# Patient Record
Sex: FEMALE | Race: WHITE | NOT HISPANIC OR LATINO | Employment: OTHER | URBAN - METROPOLITAN AREA
[De-identification: names, ages, dates, MRNs, and addresses within clinical notes are randomized per-mention and may not be internally consistent; named-entity substitution may affect disease eponyms.]

---

## 2020-08-18 ENCOUNTER — CONSULT (OUTPATIENT)
Dept: CARDIOLOGY | Facility: CLINIC | Age: 70
End: 2020-08-18

## 2020-08-18 VITALS
HEART RATE: 77 BPM | BODY MASS INDEX: 29.35 KG/M2 | SYSTOLIC BLOOD PRESSURE: 142 MMHG | DIASTOLIC BLOOD PRESSURE: 78 MMHG | WEIGHT: 187 LBS | HEIGHT: 67 IN | TEMPERATURE: 96.9 F | OXYGEN SATURATION: 98 %

## 2020-08-18 DIAGNOSIS — E78.5 HYPERLIPIDEMIA, UNSPECIFIED HYPERLIPIDEMIA TYPE: Primary | ICD-10-CM

## 2020-08-18 PROCEDURE — 99244 OFF/OP CNSLTJ NEW/EST MOD 40: CPT | Performed by: INTERNAL MEDICINE

## 2020-08-18 PROCEDURE — 93000 ELECTROCARDIOGRAM COMPLETE: CPT | Performed by: INTERNAL MEDICINE

## 2020-08-18 RX ORDER — BUPROPION HYDROCHLORIDE 300 MG/1
300 TABLET ORAL DAILY
COMMUNITY

## 2020-08-18 RX ORDER — FLECAINIDE ACETATE 100 MG/1
100 TABLET ORAL 2 TIMES DAILY
Qty: 180 TABLET | Refills: 6 | Status: SHIPPED | OUTPATIENT
Start: 2020-08-18 | End: 2022-03-07

## 2020-08-18 RX ORDER — METOPROLOL SUCCINATE 25 MG/1
25 TABLET, EXTENDED RELEASE ORAL DAILY
COMMUNITY
End: 2020-08-18 | Stop reason: SDUPTHER

## 2020-08-18 RX ORDER — METOPROLOL SUCCINATE 25 MG/1
25 TABLET, EXTENDED RELEASE ORAL DAILY
Qty: 90 TABLET | Refills: 3 | Status: SHIPPED | OUTPATIENT
Start: 2020-08-18 | End: 2022-04-05 | Stop reason: SDUPTHER

## 2020-08-18 RX ORDER — HYDROCHLOROTHIAZIDE 25 MG/1
25 TABLET ORAL DAILY
COMMUNITY
End: 2022-04-05

## 2020-08-18 RX ORDER — MONTELUKAST SODIUM 10 MG/1
10 TABLET ORAL NIGHTLY
COMMUNITY

## 2020-08-18 RX ORDER — FLECAINIDE ACETATE 100 MG/1
100 TABLET ORAL 2 TIMES DAILY
COMMUNITY
Start: 2008-08-18 | End: 2020-08-18 | Stop reason: SDUPTHER

## 2020-08-18 RX ORDER — LEVOCETIRIZINE DIHYDROCHLORIDE 5 MG/1
5 TABLET, FILM COATED ORAL EVERY EVENING
COMMUNITY

## 2020-08-18 RX ORDER — LANSOPRAZOLE 30 MG/1
30 CAPSULE, DELAYED RELEASE ORAL DAILY
COMMUNITY

## 2020-08-18 NOTE — PROGRESS NOTES
[facility] at [facility]   Consult  Mere Greenwood  1950    VISIT DATE:  08/18/20    PCP:   Leann Reece MD  [address] [address]        CC:  Atrial Fibrillation      Problem List:  1.  Atrial fibrillation  2.  GERD  3.  HTN  4.  Skin  Ca    Past cardiac history  -Cath with normal coronaries 1998  -Normal Cardiolite stress test 2011  -Echo 2012 with mild MR otherwise within normal limit    History of Present Illness:  Mere Greenwood  Is a 70 y.o. female with pertinent cardiac history detailed above.  Patient referred by Dr. Reece.  Establishing care for atrial fibrillation.  She is on Toprol XL, flecainide, Xarelto.  Also has hypertension on hydrochlorothiazide.  The patient used to follow with Dr. Jaime in [place].  Her atrial fibrillation has been well controlled.  Previous symptoms were feeling of breathlessness.  She denies any shortness of breath with exertion denies any chest pain.  EKG in office today normal.  Blood pressure mildly elevated today but reports within normal limits on previous doctors visits.  States she was told her cholesterol is mildly elevated on last check but she does have a history of intolerance to at least atorvastatin.  Otherwise doing well with no issues.  Tolerating Xarelto fine no history of any bleeding complication      There are no active problems to display for this patient.      Allergies   Allergen Reactions   • Sulfa Antibiotics Itching     Caused loss of consciousness       Social History     Socioeconomic History   • Marital status:      Spouse name: Not on file   • Number of children: Not on file   • Years of education: Not on file   • Highest education level: Not on file   Tobacco Use   • Smoking status: Never Smoker   • Smokeless tobacco: Never Used   Substance and Sexual Activity   • Alcohol use: Not Currently   • Drug use: Never       Family History   Problem Relation Age of Onset   •  "Alzheimer's disease Mother    • Alzheimer's disease Father        Current Medications:    Current Outpatient Medications:   •  buPROPion XL (Wellbutrin XL) 300 MG 24 hr tablet, Take 300 mg by mouth Daily., Disp: , Rfl:   •  Calcium Carb-Cholecalciferol (Calcium 500+D) 500-200 MG-UNIT tablet, Take 1 tablet by mouth 2 (two) times a day., Disp: , Rfl:   •  flecainide (TAMBOCOR) 100 MG tablet, Take 1 tablet by mouth 2 (Two) Times a Day., Disp: 180 tablet, Rfl: 6  •  hydroCHLOROthiazide (HYDRODIURIL) 25 MG tablet, Take 25 mg by mouth Daily., Disp: , Rfl:   •  lansoprazole (PREVACID) 30 MG capsule, Take 30 mg by mouth Daily., Disp: , Rfl:   •  levocetirizine (XYZAL) 5 MG tablet, Take 5 mg by mouth Every Evening., Disp: , Rfl:   •  metoprolol succinate XL (TOPROL-XL) 25 MG 24 hr tablet, Take 1 tablet by mouth Daily., Disp: 90 tablet, Rfl: 3  •  montelukast (SINGULAIR) 10 MG tablet, Take 10 mg by mouth Every Night., Disp: , Rfl:   •  rivaroxaban (XARELTO) 20 MG tablet, Take 1 tablet by mouth Daily With Dinner., Disp: 90 tablet, Rfl: 3     Review of Systems   Cardiovascular: Negative for chest pain, dyspnea on exertion, irregular heartbeat, leg swelling, near-syncope, palpitations and syncope.   Respiratory: Negative for cough.    All other systems reviewed and are negative.      Vitals:    08/18/20 1412   BP: 142/78   BP Location: Right arm   Patient Position: Sitting   Cuff Size: Adult   Pulse: 77   Temp: 96.9 °F (36.1 °C)   SpO2: 98%   Weight: 84.8 kg (187 lb)   Height: 168.9 cm (66.5\")       Physical Exam   Constitutional: She is oriented to person, place, and time. She appears well-developed and well-nourished.   Neck: Neck supple.   Cardiovascular: Normal rate, regular rhythm, normal heart sounds and intact distal pulses. Exam reveals no gallop and no friction rub.   No murmur heard.  Pulmonary/Chest: Effort normal and breath sounds normal.   Abdominal: Soft.   Neurological: She is alert and oriented to person, place, " and time.   Skin: Skin is warm and dry.       Diagnostic Data:    ECG 12 Lead  Date/Time: 8/18/2020 2:15 PM  Performed by: Robin العراقي MD  Authorized by: Robin العراقي MD   Comparison: not compared with previous ECG   Previous ECG: no previous ECG available  Rhythm: sinus rhythm  Rate: normal  BPM: 77  QRS axis: normal    Clinical impression: normal ECG          No results found for: CHLPL, TRIG, HDL, LDLDIRECT  No results found for: GLUCOSE, BUN, CREATININE, NA, K, CL, CO2, CREATININE, BUN  No results found for: HGBA1C  No results found for: WBC, HGB, HCT, PLT    Assessment:   Diagnosis Plan   1. Hyperlipidemia, unspecified hyperlipidemia type  ECG 12 Lead    Lipid Panel       Plan:      1.  Paroxysmal A. Fib  -Currently under rhythm control strategy with flecainide and metoprolol  -On Xarelto for anticoagulation  Chads VASC 3  -TSH normal April 2020    2.  HTN  -on HCTZ  -Labs from May 2020 reviewed and largely within normal limits    3.  Lipids  -intolerance to lipitor,  Muscle weakness  -recheck lipids, possible addition of Zetia pending results    Follow-up 6 months, sooner    Robin العراقي MD

## 2021-08-04 NOTE — PROGRESS NOTES
[facility] at [facility] - Office Note  Mere Greenwood         [address] [facility] [address]  1950   [phone] (home)        Location:  [place] office.  Visit Type: Follow Up.    08/05/21     PCP:  Saida Solo MD    Identification:  Mere Greenwood is a 71 y.o.  female  retired.      Chief Complaint:   FU on AFib, HTN    Problem List:  1.  Paroxysmal Atrial Fibrillation   A.  LHC 1998:  Normal coronaries   B.  Nuclear MPS 2011:  No evidence of ishcemia   C.  Echo 2012:  Mild MR, Normal EF    2.  GERD  3.  Essential Hypertension  4.  Skin  Ca  5.  Depression  6.  Benign Paroxysmal Positional Vertigo            Allergies   Allergen Reactions   • Sulfa Antibiotics Itching     Caused loss of consciousness         Current Outpatient Medications:   •  buPROPion XL (Wellbutrin XL) 300 MG 24 hr tablet, Take 300 mg by mouth Daily., Disp: , Rfl:   •  Calcium Carb-Cholecalciferol (Calcium 500+D) 500-200 MG-UNIT tablet, Take 1 tablet by mouth 2 (two) times a day., Disp: , Rfl:   •  flecainide (TAMBOCOR) 100 MG tablet, Take 1 tablet by mouth 2 (Two) Times a Day., Disp: 180 tablet, Rfl: 6  •  hydroCHLOROthiazide (HYDRODIURIL) 25 MG tablet, Take 25 mg by mouth Daily., Disp: , Rfl:   •  lansoprazole (PREVACID) 30 MG capsule, Take 30 mg by mouth Daily., Disp: , Rfl:   •  levocetirizine (XYZAL) 5 MG tablet, Take 5 mg by mouth Every Evening., Disp: , Rfl:   •  Methylcobalamin (B-12) 5000 MCG tablet dispersible, Place 1 tablet on the tongue Daily., Disp: , Rfl:   •  metoprolol succinate XL (TOPROL-XL) 25 MG 24 hr tablet, Take 1 tablet by mouth Daily., Disp: 90 tablet, Rfl: 3  •  montelukast (SINGULAIR) 10 MG tablet, Take 10 mg by mouth Every Night., Disp: , Rfl:   •  rivaroxaban (XARELTO) 20 MG tablet, Take 1 tablet by mouth Daily With Dinner., Disp: 90 tablet, Rfl: 3  •  rosuvastatin (CRESTOR) 10 MG tablet, Take 10 mg by mouth every night at bedtime., Disp: , Rfl:     YULIET ALFRED  "Timo is here today for a routine 6 month follow up on paroxysmal AFib on chronic NOAC, HTN, newly treated HLP.  She is doing well from a cardiac standpoint - no complaints of chest pain, no dyspnea, no weakness.  She has some fatigue but attributes this to low B12 levels and inactivity.  She is compliant with all medications and has no side effects.  She has known of hypercholesterolemia for years but couldn't tolerate Lipitor.  She recently had labs and was started on Crestor - so far she is tolerating.        The following portions of the patient's history were reviewed in the chart and updated as appropriate: allergies, current medications, past family history, past medical history, past social history, past surgical history and problem list.    Review of Systems   Constitutional: Positive for malaise/fatigue. Negative for weight loss.   Cardiovascular: Negative for chest pain, dyspnea on exertion, irregular heartbeat and palpitations.   Respiratory: Negative for cough and shortness of breath.    Hematologic/Lymphatic: Negative for bleeding problem. Does not bruise/bleed easily.   Musculoskeletal: Negative for myalgias.   Neurological: Negative for dizziness, headaches and light-headedness.   All other systems reviewed and are negative.            height is 168.9 cm (66.5\") and weight is 88.5 kg (195 lb). Her blood pressure is 128/80 and her pulse is 87. Her oxygen saturation is 98%.   Vitals and nursing note reviewed.   Constitutional:       Appearance: Normal appearance. Well-developed.   Pulmonary:      Effort: Pulmonary effort is normal.      Breath sounds: Normal breath sounds. No wheezing. No rhonchi. No rales.   Cardiovascular:      Normal rate. Regular rhythm.   Pulses:     Intact distal pulses.   Abdominal:      General: Bowel sounds are normal.      Palpations: Abdomen is soft.   Neurological:      Mental Status: Alert.             ECG 12 Lead    Date/Time: 8/5/2021 4:05 PM  Performed by: Otto" KAMRAN Flores  Authorized by: Lizz Menjivar PA   Comparison: compared with previous ECG from 8/18/2020  Similar to previous ECG  Rhythm: sinus rhythm  Rate: normal  QRS axis: normal    Clinical impression: normal ECG             Assessment/ Plan   Diagnoses and all orders for this visit:    1. Paroxysmal atrial fibrillation (CMS/HCC) (Primary):  EKG performed and reviewed - NSR, HR 79 bpm with normal DE/QT intervals.  Continue Flecainide.  Continue Xarelto for stroke prevention.  RTC 8 months with EKG or sooner PRN.    2. Essential hypertension:  Well controlled.  Continue to monitor and get routine labs with PCP.    3. Dyslipidemia:  Recent lipid panel reviewed.  , , HDL 57, . Agree with initiation of Crestor and appropriate diet.  Get annual lipid panel and CMP with PCP.    Advance Care Planning   ACP discussion was held with the patient during this visit. Patient does not have an advance directive, information provided.      Lizz Menjivar PA-C  8/5/2021 15:59 EDT

## 2021-08-05 ENCOUNTER — OFFICE VISIT (OUTPATIENT)
Dept: CARDIOLOGY | Facility: CLINIC | Age: 71
End: 2021-08-05

## 2021-08-05 VITALS
SYSTOLIC BLOOD PRESSURE: 128 MMHG | HEIGHT: 66 IN | DIASTOLIC BLOOD PRESSURE: 80 MMHG | BODY MASS INDEX: 31.34 KG/M2 | WEIGHT: 195 LBS | OXYGEN SATURATION: 98 % | HEART RATE: 87 BPM

## 2021-08-05 DIAGNOSIS — E78.5 DYSLIPIDEMIA: ICD-10-CM

## 2021-08-05 DIAGNOSIS — I10 ESSENTIAL HYPERTENSION: ICD-10-CM

## 2021-08-05 DIAGNOSIS — I48.0 PAROXYSMAL ATRIAL FIBRILLATION (HCC): Primary | ICD-10-CM

## 2021-08-05 PROCEDURE — 93000 ELECTROCARDIOGRAM COMPLETE: CPT | Performed by: PHYSICIAN ASSISTANT

## 2021-08-05 PROCEDURE — 99214 OFFICE O/P EST MOD 30 MIN: CPT | Performed by: PHYSICIAN ASSISTANT

## 2021-08-05 RX ORDER — MECOBALAMIN 5000 MCG
1 TABLET,DISINTEGRATING ORAL DAILY
COMMUNITY

## 2021-08-05 RX ORDER — ROSUVASTATIN CALCIUM 10 MG/1
10 TABLET, COATED ORAL
COMMUNITY
Start: 2021-07-21 | End: 2022-04-05

## 2022-03-07 RX ORDER — FLECAINIDE ACETATE 100 MG/1
TABLET ORAL
Qty: 180 TABLET | Refills: 3 | Status: SHIPPED | OUTPATIENT
Start: 2022-03-07 | End: 2022-04-05 | Stop reason: SDUPTHER

## 2022-04-04 PROBLEM — E78.5 HLD (HYPERLIPIDEMIA): Status: ACTIVE | Noted: 2022-04-04

## 2022-04-04 NOTE — PROGRESS NOTES
[facility]  Follow Up Visit  Mere Greenwood  1950    VISIT DATE:  04/05/22    PCP:   Saida Solo MD  [address] [address]          CC:  Atrial Fibrillation      Problem List:  1. Paroxysmal Atrial Fibrillation  2. Dyslipidemia  3. Essential Hypertension  4. History of Cancer  5. Hyperlipidemia    Cardiac Testing:  -Cath with normal coronaries 1998  -Normal Cardiolite stress test 2011  -Echo 2012 with mild MR otherwise within normal limit    History of Present Illness:  Mere Greenwood  Is a 71 y.o. female with pertinent cardiac history detailed above.  Patient has felt well since last follow-up.  No significant A. fib episodes.  Blood pressure within normal limits.  She did develop myalgias on Crestor and she is now on Lipitor 10 mg.  Lipids are elevated on most recent check.  EKG today sinus rhythm with normal intervals.  No complaints of chest pain.      Patient Active Problem List    Diagnosis Date Noted   • HLD (hyperlipidemia) 04/04/2022   • Paroxysmal atrial fibrillation (HCC) 08/05/2021   • Dyslipidemia 08/05/2021   • Essential hypertension 08/05/2021       Allergies   Allergen Reactions   • Sulfa Antibiotics Itching     Caused loss of consciousness       Social History     Socioeconomic History   • Marital status:    Tobacco Use   • Smoking status: Never Smoker   • Smokeless tobacco: Never Used   Vaping Use   • Vaping Use: Never used   Substance and Sexual Activity   • Alcohol use: Not Currently   • Drug use: Never   • Sexual activity: Defer       Family History   Problem Relation Age of Onset   • Alzheimer's disease Mother    • Alzheimer's disease Father        Current Medications:    Current Outpatient Medications:   •  Ascorbic Acid (VITAMIN C PO), Take 650 mg by mouth Daily. 2 tabs daily, Disp: , Rfl:   •  atorvastatin (LIPITOR) 10 MG tablet, Take 10 mg by mouth Daily., Disp: , Rfl:   •  buPROPion XL (WELLBUTRIN XL) 300 MG 24 hr  "tablet, Take 300 mg by mouth Daily., Disp: , Rfl:   •  Calcium Carb-Cholecalciferol 500-200 MG-UNIT tablet, Take 1 tablet by mouth 2 (two) times a day., Disp: , Rfl:   •  flecainide (TAMBOCOR) 100 MG tablet, Take 1 tablet by mouth 2 (Two) Times a Day., Disp: 180 tablet, Rfl: 3  •  lansoprazole (PREVACID) 30 MG capsule, Take 30 mg by mouth Daily., Disp: , Rfl:   •  levocetirizine (XYZAL) 5 MG tablet, Take 5 mg by mouth Every Evening., Disp: , Rfl:   •  Methylcobalamin (B-12) 5000 MCG tablet dispersible, Place 1 tablet on the tongue Daily., Disp: , Rfl:   •  metoprolol succinate XL (TOPROL-XL) 25 MG 24 hr tablet, Take 1 tablet by mouth Daily., Disp: 90 tablet, Rfl: 3  •  montelukast (SINGULAIR) 10 MG tablet, Take 10 mg by mouth Every Night., Disp: , Rfl:   •  rivaroxaban (XARELTO) 20 MG tablet, Take 1 tablet by mouth Daily With Dinner., Disp: 90 tablet, Rfl: 3  •  ezetimibe (ZETIA) 10 MG tablet, Take 1 tablet by mouth Daily., Disp: 30 tablet, Rfl: 11     Review of Systems   Cardiovascular: Negative for chest pain, claudication, dyspnea on exertion, irregular heartbeat, leg swelling, near-syncope and palpitations.       Vitals:    04/05/22 1135   BP: 128/84   BP Location: Right arm   Patient Position: Sitting   Pulse: 87   SpO2: 95%   Weight: 83.8 kg (184 lb 12.8 oz)   Height: 167.6 cm (66\")       Physical Exam  Constitutional:       Appearance: Normal appearance.   Neck:      Vascular: No carotid bruit.   Cardiovascular:      Rate and Rhythm: Normal rate and regular rhythm.      Pulses: Normal pulses.      Heart sounds: Normal heart sounds.   Pulmonary:      Effort: Pulmonary effort is normal.      Breath sounds: Normal breath sounds.   Musculoskeletal:      Right lower leg: No edema.      Left lower leg: No edema.   Neurological:      General: No focal deficit present.      Mental Status: She is alert.         Diagnostic Data:    ECG 12 Lead    Date/Time: 4/5/2022 11:59 AM  Performed by: Robin العراقي, " MD  Authorized by: Robin العراقي MD   Comparison: compared with previous ECG from 8/25/2021  Similar to previous ECG  Rhythm: sinus rhythm  Rate: normal  BPM: 88  QRS axis: left    Clinical impression: non-specific ECG          COMPREHENSIVE METABOLIC PANEL 03/11/2022   Glucose 117 H   Blood Urea Nitrogen 10   Creatinine 0.80   BUN/Creatinine Ratio Not listed   Sodium 141   Potassium 4.2   Chloride 103   Carbon Dioxide 28   Calcium 9.4   Total Protein 7.1   Albumin 4.3   ALT 11   AST 13   Alkaline Phosphatase 70   Total Bilirubin 0.3   eGFR Non African American >60     COMPLETE BLOOD COUNT 12/10/2021   WBC 6.6   Red Blood Cell Count 4.40   Hemoglobin 13.5   Hematocrit 41.7   Platelets 273     LIPID PANEL 03/11/2022   Total Cholesterol 207 H   Triglycerides 179 H   HDL 47.4   LDL Cholesterol 137 H     HEMOGLOBIN A1c 03/11/2022   Hemoglobin A1c 5.8 H       Assessment:   Diagnosis Plan   1. Paroxysmal atrial fibrillation (HCC)  ECG 12 Lead    Adult Transthoracic Echo Complete W/ Cont if Necessary Per Protocol       Plan:      1.  Atrial fibrillation  -Current medications include flecainide, metoprolol, Xarelto  -no symptomatic episodes  -acceptable EKG today  -We will obtain a echocardiogram as it has been 10 years since her last    2.  Hypertension  -controlled on toprol alone    3.  Hyperlipidemia  -On atorvastatin 10 mg, this was started in early March  -crestor casued myalagias  -Recommend Zetia 10mg in addition to help lower further without exacerbating statin side effect    Follow-up in about 8 months      Robin العراقي MD [facility]

## 2022-04-05 ENCOUNTER — OFFICE VISIT (OUTPATIENT)
Dept: CARDIOLOGY | Facility: CLINIC | Age: 72
End: 2022-04-05

## 2022-04-05 VITALS
HEART RATE: 87 BPM | SYSTOLIC BLOOD PRESSURE: 128 MMHG | OXYGEN SATURATION: 95 % | HEIGHT: 66 IN | DIASTOLIC BLOOD PRESSURE: 84 MMHG | BODY MASS INDEX: 29.7 KG/M2 | WEIGHT: 184.8 LBS

## 2022-04-05 DIAGNOSIS — I48.0 PAROXYSMAL ATRIAL FIBRILLATION: Primary | ICD-10-CM

## 2022-04-05 PROCEDURE — 99214 OFFICE O/P EST MOD 30 MIN: CPT | Performed by: INTERNAL MEDICINE

## 2022-04-05 PROCEDURE — 93000 ELECTROCARDIOGRAM COMPLETE: CPT | Performed by: INTERNAL MEDICINE

## 2022-04-05 RX ORDER — FLECAINIDE ACETATE 100 MG/1
100 TABLET ORAL 2 TIMES DAILY
Qty: 180 TABLET | Refills: 3 | Status: SHIPPED | OUTPATIENT
Start: 2022-04-05

## 2022-04-05 RX ORDER — EZETIMIBE 10 MG/1
10 TABLET ORAL DAILY
Qty: 30 TABLET | Refills: 11 | Status: SHIPPED | OUTPATIENT
Start: 2022-04-05

## 2022-04-05 RX ORDER — ATORVASTATIN CALCIUM 10 MG/1
10 TABLET, FILM COATED ORAL DAILY
COMMUNITY

## 2022-04-05 RX ORDER — METOPROLOL SUCCINATE 25 MG/1
25 TABLET, EXTENDED RELEASE ORAL DAILY
Qty: 90 TABLET | Refills: 3 | Status: SHIPPED | OUTPATIENT
Start: 2022-04-05

## 2022-04-22 ENCOUNTER — APPOINTMENT (OUTPATIENT)
Dept: CARDIOLOGY | Facility: HOSPITAL | Age: 72
End: 2022-04-22